# Patient Record
(demographics unavailable — no encounter records)

---

## 2025-03-02 NOTE — REVIEW OF SYSTEMS
[Feeling Poorly] : feeling poorly [Lower Ext Edema] : lower extremity edema [Cough] : cough [Incontinence] : incontinence [As Noted in HPI] : as noted in HPI [Limb Weakness] : limb weakness [Difficulty Walking] : difficulty walking [Anxiety] : anxiety [Feelings Of Weakness] : feelings of weakness [Negative] : Heme/Lymph [Depression] : no depression [FreeTextEntry4] : "dry mouth... voice is better though..." [de-identified] : regarding this issue and visit.

## 2025-03-02 NOTE — HISTORY OF PRESENT ILLNESS
[de-identified] : Very pleasant patient known to me from recent hospitalization at [place]. Ms. Gibson experienced a difficult/ complex post operative course following hip replacement. This included repeated/ prolonged intubation with presumably resultant partial vocal cord paralysis requiring laparoscopic assisted PEG insertion. She presents to the office today under direction of PMD with complaint of discomfort at site and some odor, but only scant staining/ drainage. They deny any difficulty with using the PEG or tolerating feeds.

## 2025-03-02 NOTE — PLAN
[FreeTextEntry1] : S/P laparoscopic assisted PEG insertion for dysphagia/ post HIP replacement vocal cord paralysis.  Overall, she is progressing well and tolerating feeds.  However, she is certainly deconditioned and requires ongoing supportive care. Discomfort/ scant serous drainage from PEG insertion site likely secondary to compression/ poor aeration of skin and soft tissue under bolster.  As such: -bolster loosed to ~ 4 cm kashif, skin cleaned and then rinsed with H202 with drain gauze applied. -instructions and supplies provided to patient and family to continue the above -encouraged to call with further interval questions/ concerns/ changes.   I remain available to them.  They are pleased and agree.  Return to office is now planned as PRN. Please note that more than 50% of face-to-face time was spent in counseling and coordination of care.

## 2025-03-02 NOTE — DATA REVIEWED
[FreeTextEntry1] : S/P Laparoscopic assistent PEG insertion at [facility] with GI on 2/3/2025- operative report reviewed.

## 2025-03-02 NOTE — PHYSICAL EXAM
[Respiratory Effort] : normal respiratory effort [Normal Rate and Rhythm] : normal rate and rhythm [No HSM] : no hepatosplenomegaly [Alert] : alert [Oriented to Person] : oriented to person [Oriented to Place] : oriented to place [Oriented to Time] : oriented to time [Anxious] : anxious [Tender] : was nontender [Enlarged] : not enlarged [de-identified] : Appears elderly weak and frail, but no acute distress.  Arrives in wheelchair. [de-identified] : Normocephalic and atraumatic.  [de-identified] : Supple with full range of motion.  [FreeTextEntry1] : Deferred.  [de-identified] : Abdomen soft and non-tense and non-tender except at PEG site. PEG site itself with some compression/ retraction of skin and soft tissue at insertion site with maceration of underlying skin. However, no eve cellulitis, appreciable SQ collection or obvious malposition. No expressible drainage today with tube functioning wiht easy flush and draw of gastric contents. [de-identified] : Deferred.  [de-identified] : Deferred.  [de-identified] : Deferred.  [de-identified] : Grossly symmetric and within normal limits without motor or sensory deficits.  [de-identified] : See abdominal exam. [de-identified] : but quite appropriately so and pleasant/ interactive/ appreciative overall.

## 2025-03-19 NOTE — HISTORY OF PRESENT ILLNESS
[de-identified] : Very pleasant patient known to me from recent hospitalization at [place]. Ms. Gibson experienced a difficult/ complex post operative course following hip replacement. This included repeated/ prolonged intubation with presumably resultant partial vocal cord paralysis requiring laparoscopic assisted PEG insertion. She presents to the office today under direction of PMD with complaint of discomfort at site and some odor, but only scant staining/ drainage. They deny any difficulty with using the PEG or tolerating feeds. [de-identified] : Very pleasant patient known to me from recent hospitalization at [place] returning to office with daughters. Ms. Gibson experienced a complex post op course following hip replacement with resultant partial vocal cord paralysis requiring laparoscopic assisted PEG insertion. She presents to the office today reporting improved, though not fully resolved discomfort at PEG site, but no further drainage or odor. Ms. Gibson continues in follow-up with ENT and Speech and Swallow team(s).

## 2025-03-19 NOTE — PHYSICAL EXAM
[Respiratory Effort] : normal respiratory effort [Normal Rate and Rhythm] : normal rate and rhythm [No HSM] : no hepatosplenomegaly [Tender] : was nontender [Enlarged] : not enlarged [Alert] : alert [Oriented to Person] : oriented to person [Oriented to Place] : oriented to place [Oriented to Time] : oriented to time [Calm] : calm [de-identified] : Appears elderly, but less weak/ frail, and in no acute distress.  Arrives in wheelchair. [de-identified] : Remains normocephalic and atraumatic.  [de-identified] : Still supple with full range of motion.  [FreeTextEntry1] : Deferred.  [de-identified] : Deferred.  [de-identified] : Abdomen soft, non-tense and non-tender. PEG site without any compression/ retraction of skin or soft tissue. Insertion site with resolved maceration of underlying skin. No cellulitis, appreciable SQ collections or malposition. [de-identified] : Deferred.  [de-identified] : Deferred.  [de-identified] : Grossly symmetric and within normal limits without motor or sensory deficit.  [de-identified] : See abdominal exam.

## 2025-03-19 NOTE — DATA REVIEWED
[FreeTextEntry1] : S/P Laparoscopic assistent PEG insertion at [facility] with GI on 2/3/2025- operative report reviewed.

## 2025-03-19 NOTE — REVIEW OF SYSTEMS
[Feeling Poorly] : feeling poorly [Feeling Tired] : feeling tired [Lower Ext Edema] : lower extremity edema [Cough] : cough [Incontinence] : incontinence [As Noted in HPI] : as noted in HPI [Limb Weakness] : limb weakness [Difficulty Walking] : difficulty walking [Anxiety] : no anxiety [Depression] : no depression [Feelings Of Weakness] : feelings of weakness [Negative] : Heme/Lymph [FreeTextEntry4] : "dry mouth... voice is better though..."

## 2025-03-19 NOTE — PLAN
[FreeTextEntry1] : S/P laparoscopic assisted PEG insertion for dysphagia/ post HIP replacement vocal cord paralysis.   Overall, she continues to progress well and is tolerating feeds.   Examination today very encouraging with benign abdomen and non-suggestive PEG site. However, bolster SLIGHTLY loosened and reassurance provided regarding her excellent skin/ local care. Will continue in ongoing Speech and Swallow evaluations and pending ENT follow-up. Encouraged to call with further interval questions/ concerns/ changes.  Once cleared for PO intake, can coordinate PEG removal via EGD with GI or though gross traction via my office.  I remain available to them.  They are pleased and agree.  Return to office is now planned as PRN.

## 2025-03-31 NOTE — REVIEW OF SYSTEMS
[Ear Pain] : ear pain [Ear Itch] : ear itch [Nasal Congestion] : nasal congestion [Hoarseness] : hoarseness [Throat Clearing] : throat clearing [Palpitations] : palpitations [Shortness Of Breath] : shortness of breath [Easy Bruising] : a tendency for easy bruising [Negative] : Endocrine [FreeTextEntry7] : difficulty swallowing [FreeTextEntry1] : daytime sleepiness

## 2025-03-31 NOTE — ASSESSMENT
[FreeTextEntry1] : Reviewed and reconciled medications, allergies, PMHx, PSHx, SocHx, FMHx.  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 Daughter states that she has a PEG tube in place. Daughter states that her ears are very clogged. She states that she has been doing speech/swallow therapy twice a week with improvement. Daughter states that she has been coughing up mucus occasionally. Daughter states she believes that she has a hearing loss. She states that she is having a modified barium swallow test to see if she can have her PEG tube removed.  Physical exam: -right ear canal: cerumen impaction and blood removed via curettage -no response to tuning forks -crusting and scabbing intranasally removed via suction -tonsils removed -dry intraorally  ENT Procedure: Flexible laryngoscopy -significant pooling postcricoid and in the piriform bilaterally -normal/near-normal motion of the left vocal cord and sluggish movement of the right vocal cord - trouble abducting  Audio 3/31/25: -Type A Tymp AU -Mild sloping to mod-severe SNHL 250-8000 Hz AU -88% discrimination at 75 dB AD -84% discrimination at 80 dB AS -right TPP: -8 -left TPP: -14  Plan: Audio - results interpreted by Dr. Holland and reviewed with the patient. -Continue speech/swallow therapy -Start saline gel spray (AYR No Drip) -Consider amplification -Pending results from [facility] -FU with results from [facility]

## 2025-03-31 NOTE — DATA REVIEWED
[de-identified] : Audio 3/31/25: -Type A Tymp AU -Mild sloping to mod-severe SNHL 250-8000 Hz AU -88% discrimination at 75 dB AD -84% discrimination at 80 dB AS -right TPP: -8 -left TPP: -14

## 2025-03-31 NOTE — PHYSICAL EXAM
[Midline] : trachea located in midline position [Removed] : palatine tonsils previously removed [Normal] : no rashes [Hearing Loss Right Only] : normal [Hearing Loss Left Only] : normal [FreeTextEntry8] : cerumen impaction and blood removed via curettage [FreeTextEntry5] : no response to tuning forks [de-identified] : crusting and scabbing intranasally removed via suction [de-identified] : dry intraorally

## 2025-03-31 NOTE — PROCEDURE
[Complicated Symptoms] : complicated symptoms requiring more thorough examination than provided by mirror [de-identified] :  Procedure: Flexible Fiberoptic Laryngoscopy: Risks, benefits, and alternatives of flexible endoscopy were explained to the patient. The patient gave oral consent to proceed. The flexible scope was inserted into the right nasal cavity and advanced towards the nasopharynx. Visualized mucosa over the turbinates and septum were as described above. The nasopharynx was clear. Oropharyngeal walls were symmetric and mobile without lesion, mass, or edema. Hypopharynx was also without lesion or edema. Larynx was mobile without lesions. Supraglottic structures were free of edema, mass, and asymmetry. True vocal folds were white without mass or lesion, but normal/near-normal motion of the left vocal cord and sluggish movement of the right vocal cord - trouble abducting. Base of tongue was within normal limits. There was significant pooling postcricoid and in the piriform bilaterally. -significant pooling postcricoid and in the piriform bilaterally -normal/near-normal motion of the left vocal cord and sluggish movement of the right vocal cord - trouble abducting

## 2025-03-31 NOTE — HISTORY OF PRESENT ILLNESS
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 Daughter states that she has a PEG tube in place. Daughter states that her ears are very clogged. She states that she has been doing speech/swallow therapy twice a week with improvement. Daughter states that she has been coughing up mucus occasionally. Daughter states she believes that she has a hearing loss. She states that she is having a modified barium swallow test to see if she can have her PEG tube removed.

## 2025-03-31 NOTE — ADDENDUM
[FreeTextEntry1] :  Documented by J Carlos Le acting as scribe for Dr. Holland on 03/31/2025. All Medical record entries made by the Scribe were at my, Dr. Holland, direction and personally dictated by me on 03/31/2025 . I have reviewed the chart and agree that the record accurately reflects my personal performance of the history, physical exam, assessment and plan. I have also personally directed, reviewed, and agreed with the discharge instructions.

## 2025-03-31 NOTE — CONSULT LETTER
[Dear  ___] : Dear  [unfilled], [Consult Letter:] : I had the pleasure of evaluating your patient, [unfilled]. [Please see my note below.] : Please see my note below. [Consult Closing:] : Thank you very much for allowing me to participate in the care of this patient.  If you have any questions, please do not hesitate to contact me. [Sincerely,] : Sincerely, [FreeTextEntry3] :  Duane Holland MD FACS

## 2025-04-29 NOTE — REVIEW OF SYSTEMS
[Feeling Poorly] : not feeling poorly [Feeling Tired] : feeling tired [Lower Ext Edema] : lower extremity edema [Cough] : cough [Incontinence] : incontinence [Arthralgias] : arthralgias [As Noted in HPI] : as noted in HPI [Limb Weakness] : limb weakness [Difficulty Walking] : difficulty walking [Anxiety] : no anxiety [Depression] : no depression [Feelings Of Weakness] : feelings of weakness [Negative] : Heme/Lymph [FreeTextEntry4] : "dry mouth... voice is better though..."

## 2025-04-29 NOTE — HISTORY OF PRESENT ILLNESS
[de-identified] : Very pleasant patient known to me from recent hospitalization at [place]. Ms. Gibson experienced a difficult/ complex post operative course following hip replacement. This included repeated/ prolonged intubation with presumably resultant partial vocal cord paralysis requiring laparoscopic assisted PEG insertion. She presents to the office today under direction of PMD with complaint of discomfort at site and some odor, but only scant staining/ drainage. They deny any difficulty with using the PEG or tolerating feeds. [de-identified] : Very pleasant patient known to me from recent hospitalization at [place] returning to office with daughter with complaint of intermittent small volume drainage at PEG site. Ms. Gibson experienced a complex post op course following hip replacement with resultant partial vocal cord paralysis requiring laparoscopic assisted PEG insertion. She presents to the office today reporting overall improved, though not yet fully resolved dysphagia, though Speech/ Swallow therapy continues. She reports minimal if any discomfort at PEG site with scant intermittent staining of dressing, but no eve pain or foul odor.

## 2025-04-29 NOTE — DATA REVIEWED
[FreeTextEntry1] : S/P Laparoscopic assistent PEG insertion at [facility] with GI on 2/3/2025- operative report reviewed.

## 2025-04-29 NOTE — PHYSICAL EXAM
[Respiratory Effort] : normal respiratory effort [Normal Rate and Rhythm] : normal rate and rhythm [No HSM] : no hepatosplenomegaly [Tender] : was nontender [Enlarged] : not enlarged [Alert] : alert [Oriented to Person] : oriented to person [Oriented to Place] : oriented to place [Oriented to Time] : oriented to time [Calm] : calm [de-identified] : Appears elderly, but less weak/ frail, and in no acute distress.  Arrives in wheelchair. [de-identified] : Remains normocephalic and atraumatic- stable. [de-identified] : Still supple with full range of motion- stable. [FreeTextEntry1] : Deferred.  [de-identified] : Deferred.  [de-identified] : Abdomen soft, non-tense and completely non-tender. PEG site without compression/ retraction of skin/ soft tissue.  Insertion site with minimal maceration of surrounding skin. No cellulitis.  No appreciable SQ collection.  No malposition. [de-identified] : Deferred.  [de-identified] : Deferred.  [de-identified] : Grossly symmetric and within normal limits without motor or sensory deficits.  [de-identified] : See abdominal exam.

## 2025-04-29 NOTE — PLAN
[FreeTextEntry1] : S/P laparoscopic assisted PEG insertion for dysphagia/ post HIP replacement vocal cord paralysis.   Overall, she continues to progress well and is tolerating her feeds.   Examination today encouraging with benign abdomen and non-suggestive PEG site. Reassurance provided regarding her excellent skin/ local care and educated that scant intermittent staining/ discharge is not unexpected/ is anticipated. To continue in ongoing Speech and Swallow evaluations and further ENT follow-up. Encouraged to call with further interval questions/ concerns/ changes.  We have discussed that local care with warm water and soap wash to site daily is mandatory. They will continue to use H202 PRN for occasional irritation from drainage/ maceration. She already has 2 by 2 gauze to protect the site and I have given them an additional supply of 4 by 4 drain gauze to trial. I remain available to them.   They are pleased and agree.   Return to office is planned now as PRN.